# Patient Record
Sex: FEMALE | Race: WHITE | NOT HISPANIC OR LATINO | Employment: UNEMPLOYED | URBAN - NONMETROPOLITAN AREA
[De-identification: names, ages, dates, MRNs, and addresses within clinical notes are randomized per-mention and may not be internally consistent; named-entity substitution may affect disease eponyms.]

---

## 2017-07-10 ENCOUNTER — TRANSCRIBE ORDERS (OUTPATIENT)
Dept: ADMINISTRATIVE | Facility: HOSPITAL | Age: 42
End: 2017-07-10

## 2017-07-10 DIAGNOSIS — Z12.31 VISIT FOR SCREENING MAMMOGRAM: Primary | ICD-10-CM

## 2017-07-20 ENCOUNTER — HOSPITAL ENCOUNTER (OUTPATIENT)
Dept: MAMMOGRAPHY | Facility: HOSPITAL | Age: 42
Discharge: HOME | End: 2017-07-20
Admitting: NURSE PRACTITIONER

## 2017-07-20 DIAGNOSIS — Z12.31 VISIT FOR SCREENING MAMMOGRAM: ICD-10-CM

## 2017-07-20 PROCEDURE — 77063 BREAST TOMOSYNTHESIS BI: CPT

## 2017-07-20 PROCEDURE — 77067 SCR MAMMO BI INCL CAD: CPT | Performed by: RADIOLOGY

## 2017-07-20 PROCEDURE — G0202 SCR MAMMO BI INCL CAD: HCPCS

## 2017-07-20 PROCEDURE — 77063 BREAST TOMOSYNTHESIS BI: CPT | Performed by: RADIOLOGY

## 2017-08-20 ENCOUNTER — PREP FOR SURGERY (OUTPATIENT)
Dept: OTHER | Facility: HOSPITAL | Age: 42
End: 2017-08-20

## 2017-08-20 DIAGNOSIS — N93.9 ABNORMAL UTERINE BLEEDING: ICD-10-CM

## 2017-08-20 DIAGNOSIS — D25.9 LEIOMYOMA OF UTERUS, UNSPECIFIED: Primary | ICD-10-CM

## 2017-08-20 DIAGNOSIS — R19.00 ABDOMINAL OR PELVIC SWELLING, MASS OR LUMP, UNSPECIFIED SITE: ICD-10-CM

## 2017-08-20 RX ORDER — SODIUM CHLORIDE 0.9 % (FLUSH) 0.9 %
1-10 SYRINGE (ML) INJECTION AS NEEDED
Status: CANCELLED | OUTPATIENT
Start: 2017-08-20

## 2017-08-21 ENCOUNTER — APPOINTMENT (OUTPATIENT)
Dept: PREADMISSION TESTING | Facility: HOSPITAL | Age: 42
End: 2017-08-21

## 2017-08-21 DIAGNOSIS — R19.00 ABDOMINAL OR PELVIC SWELLING, MASS OR LUMP, UNSPECIFIED SITE: ICD-10-CM

## 2017-08-21 DIAGNOSIS — D25.9 LEIOMYOMA OF UTERUS, UNSPECIFIED: ICD-10-CM

## 2017-08-21 DIAGNOSIS — N93.9 ABNORMAL UTERINE BLEEDING: ICD-10-CM

## 2017-08-21 LAB
ABO GROUP BLD: NORMAL
BASOPHILS # BLD AUTO: 0.05 10*3/MM3 (ref 0–0.3)
BASOPHILS NFR BLD AUTO: 0.7 % (ref 0–2)
BLD GP AB SCN SERPL QL: NEGATIVE
DEPRECATED RDW RBC AUTO: 46 FL (ref 37–54)
EOSINOPHIL # BLD AUTO: 0.3 10*3/MM3 (ref 0–0.7)
EOSINOPHIL NFR BLD AUTO: 4.2 % (ref 0–5)
ERYTHROCYTE [DISTWIDTH] IN BLOOD BY AUTOMATED COUNT: 14.7 % (ref 11.5–14.5)
HCG SERPL QL: NEGATIVE
HCT VFR BLD AUTO: 37.1 % (ref 37–47)
HGB BLD-MCNC: 11.5 G/DL (ref 12–16)
IMM GRANULOCYTES # BLD: 0.01 10*3/MM3 (ref 0–0.03)
IMM GRANULOCYTES NFR BLD: 0.1 % (ref 0–0.5)
LYMPHOCYTES # BLD AUTO: 0.8 10*3/MM3 (ref 1–3)
LYMPHOCYTES NFR BLD AUTO: 11.1 % (ref 21–51)
MCH RBC QN AUTO: 27.3 PG (ref 27–33)
MCHC RBC AUTO-ENTMCNC: 31 G/DL (ref 33–37)
MCV RBC AUTO: 88.1 FL (ref 80–94)
MONOCYTES # BLD AUTO: 0.52 10*3/MM3 (ref 0.1–0.9)
MONOCYTES NFR BLD AUTO: 7.2 % (ref 0–10)
NEUTROPHILS # BLD AUTO: 5.54 10*3/MM3 (ref 1.4–6.5)
NEUTROPHILS NFR BLD AUTO: 76.7 % (ref 30–70)
PLATELET # BLD AUTO: 310 10*3/MM3 (ref 130–400)
PMV BLD AUTO: 9.8 FL (ref 6–10)
RBC # BLD AUTO: 4.21 10*6/MM3 (ref 4.2–5.4)
RH BLD: POSITIVE
WBC NRBC COR # BLD: 7.22 10*3/MM3 (ref 4.5–12.5)

## 2017-08-21 PROCEDURE — 84703 CHORIONIC GONADOTROPIN ASSAY: CPT | Performed by: PHYSICIAN ASSISTANT

## 2017-08-21 PROCEDURE — 36415 COLL VENOUS BLD VENIPUNCTURE: CPT

## 2017-08-21 PROCEDURE — 86900 BLOOD TYPING SEROLOGIC ABO: CPT | Performed by: PHYSICIAN ASSISTANT

## 2017-08-21 PROCEDURE — 86901 BLOOD TYPING SEROLOGIC RH(D): CPT | Performed by: PHYSICIAN ASSISTANT

## 2017-08-21 PROCEDURE — 85025 COMPLETE CBC W/AUTO DIFF WBC: CPT | Performed by: PHYSICIAN ASSISTANT

## 2017-08-21 PROCEDURE — 86850 RBC ANTIBODY SCREEN: CPT | Performed by: PHYSICIAN ASSISTANT

## 2017-08-21 RX ORDER — HYDROXYCHLOROQUINE SULFATE 200 MG/1
200 TABLET, FILM COATED ORAL 2 TIMES DAILY
COMMUNITY

## 2017-08-21 RX ORDER — DIPHENHYDRAMINE HCL 25 MG
25 CAPSULE ORAL NIGHTLY PRN
COMMUNITY

## 2017-08-21 RX ORDER — PREDNISONE 10 MG/1
10 TABLET ORAL DAILY PRN
COMMUNITY

## 2017-08-21 RX ORDER — FERROUS SULFATE 325(65) MG
325 TABLET ORAL 2 TIMES DAILY WITH MEALS
COMMUNITY

## 2017-08-21 RX ORDER — IBUPROFEN 200 MG
200 TABLET ORAL 2 TIMES DAILY PRN
COMMUNITY
End: 2017-08-24 | Stop reason: HOSPADM

## 2017-08-21 NOTE — DISCHARGE INSTRUCTIONS
TAKE the following medications the morning of surgery:  All heart or blood pressure medications    Please discontinue all blood thinners and anticoagulants (except aspirin) prior to surgery as per your surgeon and cardiologist instructions.  Aspirin may be continued up to the day prior to surgery.    HOLD all diabetic medications the morning of surgery as order by physician.    Arrival time for surgery on 8/23/2017 is 0630 am, please follow detailed instructions on hand out provided by nurse on use of prep cloths also provided by nurse. Return this   sheet to preop nurse on day of surgery  General Instructions:  • Do NOT eat or drink after midnight 8/22/2017 which includes water, mints, or gum.  • You may brush your teeth. Dental appliances that are removable must be taken out day of surgery.  • Do NOT smoke, chew tobacco, or drink alcohol within 24 hours prior to surgery.  • Bring medications in original bottles, any inhalers and if applicable your C-PAP/BI-PAP machine  • Bring any papers given to you in the doctor’s office  • Wear clean, comfortable clothes and socks  • Do NOT wear contact lenses or make-up or dark nail polish.  Bring a case for your glasses if applicable.  • Bring crutches or walker if applicable  • Leave all other valuables and jewelry at home  • If you were given a blood bank armband, continue to wear it until discharged.    Preventing a Surgical Site Infection:  • Shower on the morning of surgery using a fresh bar of anti-bacterial soap (such as Dial) and clean washcloth.  Dry with a clean towel and dress in clean clothing.  • For 2 to 3 days before surgery, avoid shaving with a razor near where you will have surgery because the razor can irritate skin and make it easier to develop an infection.  Ask your surgeon if you will be receiving antibiotics prior to surgery.  • Make sure you, your family, and all healthcare providers clean their hands with soap and water or an alcohol-based hand   before caring for you or your wound.  • If at all possible, quit smoking as many days before surgery as you can.    Day of Surgery:  Upon arrival, a pre-op nurse and anesthesiologist will review your health history, obtain vital signs, and answer questions you may have.  The only belongings needed at this time will be your home medications and if applicable you C-PAP/BI-PAP machine.  If you are staying overnight, your family can leave the rest of your belongings in the car and bring them to your room later.  A pre-op nurse will start an IV and you may receive medication in preparation for surgery.  Due to patient privacy and limited space, only one member of your family will be able to accompany you in the pre-op area.  While you are in surgery your family should notify the waiting room  if they leave the waiting room area and provide a contact number.  Please be aware that surgery does come with discomfort.  We want to make every effort to control your discomfort so please discuss any uncontrolled symptoms with your nurse.  Your doctor will most likely have prescribed pain medications.  If you are going home after surgery you will receive individualized written care instructions before being discharged.  A responsible adult must drive you to and from the hospital on the day of surgery and stay with you for 24 hours.  If you are staying overnight following surgery, you will be transported to your hospital room following the recovery period.

## 2017-08-23 ENCOUNTER — HOSPITAL ENCOUNTER (OUTPATIENT)
Facility: HOSPITAL | Age: 42
Setting detail: OBSERVATION
Discharge: HOME | End: 2017-08-24
Attending: OBSTETRICS & GYNECOLOGY | Admitting: OBSTETRICS & GYNECOLOGY

## 2017-08-23 ENCOUNTER — ANESTHESIA EVENT (OUTPATIENT)
Dept: PERIOP | Facility: HOSPITAL | Age: 42
End: 2017-08-23

## 2017-08-23 ENCOUNTER — ANESTHESIA (OUTPATIENT)
Dept: PERIOP | Facility: HOSPITAL | Age: 42
End: 2017-08-23

## 2017-08-23 DIAGNOSIS — N93.9 ABNORMAL UTERINE BLEEDING (AUB): ICD-10-CM

## 2017-08-23 PROCEDURE — G0378 HOSPITAL OBSERVATION PER HR: HCPCS

## 2017-08-23 PROCEDURE — 25010000002 MIDAZOLAM PER 1 MG: Performed by: NURSE ANESTHETIST, CERTIFIED REGISTERED

## 2017-08-23 PROCEDURE — 25010000002 ONDANSETRON PER 1 MG: Performed by: NURSE ANESTHETIST, CERTIFIED REGISTERED

## 2017-08-23 PROCEDURE — 25010000002 HYDROMORPHONE PER 4 MG: Performed by: NURSE ANESTHETIST, CERTIFIED REGISTERED

## 2017-08-23 PROCEDURE — 25010000002 FENTANYL CITRATE (PF) 100 MCG/2ML SOLUTION: Performed by: NURSE ANESTHETIST, CERTIFIED REGISTERED

## 2017-08-23 PROCEDURE — 25010000002 CEFOXITIN PER 1 G: Performed by: NURSE ANESTHETIST, CERTIFIED REGISTERED

## 2017-08-23 PROCEDURE — 25010000002 DEXAMETHASONE PER 1 MG: Performed by: NURSE ANESTHETIST, CERTIFIED REGISTERED

## 2017-08-23 PROCEDURE — 88307 TISSUE EXAM BY PATHOLOGIST: CPT | Performed by: OBSTETRICS & GYNECOLOGY

## 2017-08-23 PROCEDURE — 94799 UNLISTED PULMONARY SVC/PX: CPT

## 2017-08-23 PROCEDURE — 25010000002 KETOROLAC TROMETHAMINE PER 15 MG: Performed by: NURSE ANESTHETIST, CERTIFIED REGISTERED

## 2017-08-23 PROCEDURE — 25010000002 PROPOFOL 10 MG/ML EMULSION: Performed by: NURSE ANESTHETIST, CERTIFIED REGISTERED

## 2017-08-23 PROCEDURE — 25010000002 ONDANSETRON PER 1 MG: Performed by: OBSTETRICS & GYNECOLOGY

## 2017-08-23 RX ORDER — KETOROLAC TROMETHAMINE 30 MG/ML
30 INJECTION, SOLUTION INTRAMUSCULAR; INTRAVENOUS EVERY 6 HOURS PRN
Status: DISCONTINUED | OUTPATIENT
Start: 2017-08-23 | End: 2017-08-24 | Stop reason: HOSPADM

## 2017-08-23 RX ORDER — FERROUS SULFATE 325(65) MG
325 TABLET ORAL 2 TIMES DAILY WITH MEALS
Status: DISCONTINUED | OUTPATIENT
Start: 2017-08-23 | End: 2017-08-24 | Stop reason: HOSPADM

## 2017-08-23 RX ORDER — ONDANSETRON 2 MG/ML
4 INJECTION INTRAMUSCULAR; INTRAVENOUS EVERY 6 HOURS PRN
Status: DISCONTINUED | OUTPATIENT
Start: 2017-08-23 | End: 2017-08-24 | Stop reason: HOSPADM

## 2017-08-23 RX ORDER — PREDNISONE 10 MG/1
10 TABLET ORAL DAILY PRN
Status: CANCELLED | OUTPATIENT
Start: 2017-08-23

## 2017-08-23 RX ORDER — DEXAMETHASONE SODIUM PHOSPHATE 10 MG/ML
INJECTION INTRAMUSCULAR; INTRAVENOUS AS NEEDED
Status: DISCONTINUED | OUTPATIENT
Start: 2017-08-23 | End: 2017-08-23 | Stop reason: SURG

## 2017-08-23 RX ORDER — IPRATROPIUM BROMIDE AND ALBUTEROL SULFATE 2.5; .5 MG/3ML; MG/3ML
3 SOLUTION RESPIRATORY (INHALATION) ONCE AS NEEDED
Status: DISCONTINUED | OUTPATIENT
Start: 2017-08-23 | End: 2017-08-23 | Stop reason: HOSPADM

## 2017-08-23 RX ORDER — ROCURONIUM BROMIDE 10 MG/ML
INJECTION, SOLUTION INTRAVENOUS AS NEEDED
Status: DISCONTINUED | OUTPATIENT
Start: 2017-08-23 | End: 2017-08-23 | Stop reason: SURG

## 2017-08-23 RX ORDER — IBUPROFEN 800 MG/1
800 TABLET ORAL 3 TIMES DAILY
Status: DISCONTINUED | OUTPATIENT
Start: 2017-08-23 | End: 2017-08-24 | Stop reason: HOSPADM

## 2017-08-23 RX ORDER — PROMETHAZINE HYDROCHLORIDE 12.5 MG/1
12.5 SUPPOSITORY RECTAL EVERY 6 HOURS PRN
Status: DISCONTINUED | OUTPATIENT
Start: 2017-08-23 | End: 2017-08-24 | Stop reason: HOSPADM

## 2017-08-23 RX ORDER — PROMETHAZINE HYDROCHLORIDE 25 MG/ML
12.5 INJECTION, SOLUTION INTRAMUSCULAR; INTRAVENOUS EVERY 6 HOURS PRN
Status: DISCONTINUED | OUTPATIENT
Start: 2017-08-23 | End: 2017-08-24 | Stop reason: HOSPADM

## 2017-08-23 RX ORDER — CEFOXITIN 2 G/1
INJECTION, POWDER, FOR SOLUTION INTRAVENOUS AS NEEDED
Status: DISCONTINUED | OUTPATIENT
Start: 2017-08-23 | End: 2017-08-23 | Stop reason: SURG

## 2017-08-23 RX ORDER — FENTANYL CITRATE 50 UG/ML
INJECTION, SOLUTION INTRAMUSCULAR; INTRAVENOUS AS NEEDED
Status: DISCONTINUED | OUTPATIENT
Start: 2017-08-23 | End: 2017-08-23 | Stop reason: SURG

## 2017-08-23 RX ORDER — MAGNESIUM HYDROXIDE 1200 MG/15ML
LIQUID ORAL AS NEEDED
Status: DISCONTINUED | OUTPATIENT
Start: 2017-08-23 | End: 2017-08-23 | Stop reason: HOSPADM

## 2017-08-23 RX ORDER — OXYCODONE HYDROCHLORIDE AND ACETAMINOPHEN 5; 325 MG/1; MG/1
1 TABLET ORAL EVERY 4 HOURS PRN
Status: DISCONTINUED | OUTPATIENT
Start: 2017-08-23 | End: 2017-08-24 | Stop reason: HOSPADM

## 2017-08-23 RX ORDER — FERROUS SULFATE 325(65) MG
325 TABLET ORAL 2 TIMES DAILY WITH MEALS
Status: CANCELLED | OUTPATIENT
Start: 2017-08-23

## 2017-08-23 RX ORDER — HYDROXYCHLOROQUINE SULFATE 200 MG/1
200 TABLET, FILM COATED ORAL EVERY 12 HOURS SCHEDULED
Status: CANCELLED | OUTPATIENT
Start: 2017-08-23

## 2017-08-23 RX ORDER — KETOROLAC TROMETHAMINE 30 MG/ML
INJECTION, SOLUTION INTRAMUSCULAR; INTRAVENOUS AS NEEDED
Status: DISCONTINUED | OUTPATIENT
Start: 2017-08-23 | End: 2017-08-23 | Stop reason: SURG

## 2017-08-23 RX ORDER — HYDROMORPHONE HCL 110MG/55ML
PATIENT CONTROLLED ANALGESIA SYRINGE INTRAVENOUS AS NEEDED
Status: DISCONTINUED | OUTPATIENT
Start: 2017-08-23 | End: 2017-08-23 | Stop reason: SURG

## 2017-08-23 RX ORDER — SODIUM CHLORIDE 0.9 % (FLUSH) 0.9 %
1-10 SYRINGE (ML) INJECTION AS NEEDED
Status: DISCONTINUED | OUTPATIENT
Start: 2017-08-23 | End: 2017-08-23 | Stop reason: HOSPADM

## 2017-08-23 RX ORDER — MIDAZOLAM HYDROCHLORIDE 1 MG/ML
INJECTION INTRAMUSCULAR; INTRAVENOUS AS NEEDED
Status: DISCONTINUED | OUTPATIENT
Start: 2017-08-23 | End: 2017-08-23 | Stop reason: SURG

## 2017-08-23 RX ORDER — FAMOTIDINE 10 MG/ML
INJECTION, SOLUTION INTRAVENOUS AS NEEDED
Status: DISCONTINUED | OUTPATIENT
Start: 2017-08-23 | End: 2017-08-23 | Stop reason: SURG

## 2017-08-23 RX ORDER — ONDANSETRON 4 MG/1
4 TABLET, ORALLY DISINTEGRATING ORAL EVERY 6 HOURS PRN
Status: DISCONTINUED | OUTPATIENT
Start: 2017-08-23 | End: 2017-08-24 | Stop reason: HOSPADM

## 2017-08-23 RX ORDER — DIPHENHYDRAMINE HCL 25 MG
25 CAPSULE ORAL DAILY PRN
Status: DISCONTINUED | OUTPATIENT
Start: 2017-08-23 | End: 2017-08-24 | Stop reason: HOSPADM

## 2017-08-23 RX ORDER — NALOXONE HCL 0.4 MG/ML
0.1 VIAL (ML) INJECTION
Status: DISCONTINUED | OUTPATIENT
Start: 2017-08-23 | End: 2017-08-24 | Stop reason: HOSPADM

## 2017-08-23 RX ORDER — PREDNISONE 10 MG/1
10 TABLET ORAL DAILY PRN
Status: DISCONTINUED | OUTPATIENT
Start: 2017-08-23 | End: 2017-08-24 | Stop reason: HOSPADM

## 2017-08-23 RX ORDER — ONDANSETRON 2 MG/ML
4 INJECTION INTRAMUSCULAR; INTRAVENOUS ONCE AS NEEDED
Status: DISCONTINUED | OUTPATIENT
Start: 2017-08-23 | End: 2017-08-23 | Stop reason: HOSPADM

## 2017-08-23 RX ORDER — LIDOCAINE HYDROCHLORIDE 20 MG/ML
INJECTION, SOLUTION INFILTRATION; PERINEURAL AS NEEDED
Status: DISCONTINUED | OUTPATIENT
Start: 2017-08-23 | End: 2017-08-23 | Stop reason: SURG

## 2017-08-23 RX ORDER — OXYCODONE AND ACETAMINOPHEN 10; 325 MG/1; MG/1
1 TABLET ORAL EVERY 4 HOURS PRN
Status: DISCONTINUED | OUTPATIENT
Start: 2017-08-23 | End: 2017-08-24 | Stop reason: HOSPADM

## 2017-08-23 RX ORDER — METOCLOPRAMIDE HYDROCHLORIDE 5 MG/ML
10 INJECTION INTRAMUSCULAR; INTRAVENOUS EVERY 6 HOURS PRN
Status: DISCONTINUED | OUTPATIENT
Start: 2017-08-23 | End: 2017-08-24 | Stop reason: HOSPADM

## 2017-08-23 RX ORDER — PROPOFOL 10 MG/ML
VIAL (ML) INTRAVENOUS AS NEEDED
Status: DISCONTINUED | OUTPATIENT
Start: 2017-08-23 | End: 2017-08-23 | Stop reason: SURG

## 2017-08-23 RX ORDER — HYDROXYCHLOROQUINE SULFATE 200 MG/1
200 TABLET, FILM COATED ORAL EVERY 12 HOURS SCHEDULED
Status: DISCONTINUED | OUTPATIENT
Start: 2017-08-23 | End: 2017-08-24 | Stop reason: HOSPADM

## 2017-08-23 RX ORDER — SODIUM CHLORIDE, SODIUM LACTATE, POTASSIUM CHLORIDE, CALCIUM CHLORIDE 600; 310; 30; 20 MG/100ML; MG/100ML; MG/100ML; MG/100ML
125 INJECTION, SOLUTION INTRAVENOUS CONTINUOUS
Status: DISCONTINUED | OUTPATIENT
Start: 2017-08-23 | End: 2017-08-24 | Stop reason: HOSPADM

## 2017-08-23 RX ORDER — SODIUM CHLORIDE, SODIUM LACTATE, POTASSIUM CHLORIDE, CALCIUM CHLORIDE 600; 310; 30; 20 MG/100ML; MG/100ML; MG/100ML; MG/100ML
125 INJECTION, SOLUTION INTRAVENOUS CONTINUOUS
Status: DISCONTINUED | OUTPATIENT
Start: 2017-08-23 | End: 2017-08-23 | Stop reason: HOSPADM

## 2017-08-23 RX ORDER — MEPERIDINE HYDROCHLORIDE 25 MG/ML
12.5 INJECTION INTRAMUSCULAR; INTRAVENOUS; SUBCUTANEOUS
Status: DISCONTINUED | OUTPATIENT
Start: 2017-08-23 | End: 2017-08-23 | Stop reason: HOSPADM

## 2017-08-23 RX ORDER — PROMETHAZINE HYDROCHLORIDE 25 MG/1
12.5 TABLET ORAL EVERY 6 HOURS PRN
Status: DISCONTINUED | OUTPATIENT
Start: 2017-08-23 | End: 2017-08-24 | Stop reason: HOSPADM

## 2017-08-23 RX ORDER — ONDANSETRON 4 MG/1
4 TABLET, FILM COATED ORAL EVERY 6 HOURS PRN
Status: DISCONTINUED | OUTPATIENT
Start: 2017-08-23 | End: 2017-08-24 | Stop reason: HOSPADM

## 2017-08-23 RX ORDER — ZOLPIDEM TARTRATE 5 MG/1
5 TABLET ORAL NIGHTLY PRN
Status: DISCONTINUED | OUTPATIENT
Start: 2017-08-23 | End: 2017-08-24 | Stop reason: HOSPADM

## 2017-08-23 RX ORDER — ONDANSETRON 2 MG/ML
INJECTION INTRAMUSCULAR; INTRAVENOUS AS NEEDED
Status: DISCONTINUED | OUTPATIENT
Start: 2017-08-23 | End: 2017-08-23 | Stop reason: SURG

## 2017-08-23 RX ORDER — IBUPROFEN 200 MG
200 TABLET ORAL 2 TIMES DAILY PRN
Status: DISCONTINUED | OUTPATIENT
Start: 2017-08-23 | End: 2017-08-24 | Stop reason: HOSPADM

## 2017-08-23 RX ORDER — BACTERIOSTATIC SODIUM CHLORIDE 0.9 %
VIAL (ML) INJECTION AS NEEDED
Status: DISCONTINUED | OUTPATIENT
Start: 2017-08-23 | End: 2017-08-23 | Stop reason: HOSPADM

## 2017-08-23 RX ORDER — ACETAMINOPHEN 325 MG/1
650 TABLET ORAL EVERY 4 HOURS PRN
Status: DISCONTINUED | OUTPATIENT
Start: 2017-08-23 | End: 2017-08-24 | Stop reason: HOSPADM

## 2017-08-23 RX ORDER — FENTANYL CITRATE 50 UG/ML
50 INJECTION, SOLUTION INTRAMUSCULAR; INTRAVENOUS
Status: DISCONTINUED | OUTPATIENT
Start: 2017-08-23 | End: 2017-08-23 | Stop reason: HOSPADM

## 2017-08-23 RX ORDER — OXYCODONE HYDROCHLORIDE AND ACETAMINOPHEN 5; 325 MG/1; MG/1
1 TABLET ORAL ONCE AS NEEDED
Status: DISCONTINUED | OUTPATIENT
Start: 2017-08-23 | End: 2017-08-23 | Stop reason: HOSPADM

## 2017-08-23 RX ORDER — HYDROMORPHONE HYDROCHLORIDE 1 MG/ML
0.5 INJECTION, SOLUTION INTRAMUSCULAR; INTRAVENOUS; SUBCUTANEOUS
Status: DISCONTINUED | OUTPATIENT
Start: 2017-08-23 | End: 2017-08-24 | Stop reason: HOSPADM

## 2017-08-23 RX ORDER — DIPHENHYDRAMINE HCL 25 MG
25 CAPSULE ORAL DAILY PRN
Status: CANCELLED | OUTPATIENT
Start: 2017-08-23

## 2017-08-23 RX ORDER — IBUPROFEN 200 MG
200 TABLET ORAL 2 TIMES DAILY PRN
Status: CANCELLED | OUTPATIENT
Start: 2017-08-23

## 2017-08-23 RX ADMIN — ONDANSETRON 4 MG: 2 INJECTION, SOLUTION INTRAMUSCULAR; INTRAVENOUS at 08:04

## 2017-08-23 RX ADMIN — LIDOCAINE HYDROCHLORIDE 40 MG: 20 INJECTION, SOLUTION INFILTRATION; PERINEURAL at 07:40

## 2017-08-23 RX ADMIN — FAMOTIDINE 20 MG: 10 INJECTION, SOLUTION INTRAVENOUS at 08:04

## 2017-08-23 RX ADMIN — MIDAZOLAM HYDROCHLORIDE 2 MG: 1 INJECTION, SOLUTION INTRAMUSCULAR; INTRAVENOUS at 07:40

## 2017-08-23 RX ADMIN — KETOROLAC TROMETHAMINE 30 MG: 30 INJECTION, SOLUTION INTRAMUSCULAR; INTRAVENOUS at 08:45

## 2017-08-23 RX ADMIN — CEFOXITIN 2 G: 2 INJECTION, POWDER, FOR SOLUTION INTRAVENOUS at 07:53

## 2017-08-23 RX ADMIN — SODIUM CHLORIDE, POTASSIUM CHLORIDE, SODIUM LACTATE AND CALCIUM CHLORIDE 125 ML/HR: 600; 310; 30; 20 INJECTION, SOLUTION INTRAVENOUS at 18:34

## 2017-08-23 RX ADMIN — ONDANSETRON 4 MG: 2 INJECTION, SOLUTION INTRAMUSCULAR; INTRAVENOUS at 17:18

## 2017-08-23 RX ADMIN — HYDROXYCHLOROQUINE SULFATE 200 MG: 200 TABLET ORAL at 20:27

## 2017-08-23 RX ADMIN — SODIUM CHLORIDE, POTASSIUM CHLORIDE, SODIUM LACTATE AND CALCIUM CHLORIDE: 600; 310; 30; 20 INJECTION, SOLUTION INTRAVENOUS at 07:40

## 2017-08-23 RX ADMIN — HYDROMORPHONE HYDROCHLORIDE 1 MG: 2 INJECTION, SOLUTION INTRAMUSCULAR; INTRAVENOUS; SUBCUTANEOUS at 08:20

## 2017-08-23 RX ADMIN — ROCURONIUM BROMIDE 30 MG: 10 INJECTION INTRAVENOUS at 07:48

## 2017-08-23 RX ADMIN — PROPOFOL 120 MG: 10 INJECTION, EMULSION INTRAVENOUS at 07:48

## 2017-08-23 RX ADMIN — IBUPROFEN 800 MG: 800 TABLET ORAL at 21:59

## 2017-08-23 RX ADMIN — SODIUM CHLORIDE, POTASSIUM CHLORIDE, SODIUM LACTATE AND CALCIUM CHLORIDE 125 ML/HR: 600; 310; 30; 20 INJECTION, SOLUTION INTRAVENOUS at 07:07

## 2017-08-23 RX ADMIN — HYDROMORPHONE HYDROCHLORIDE 1 MG: 2 INJECTION, SOLUTION INTRAMUSCULAR; INTRAVENOUS; SUBCUTANEOUS at 08:04

## 2017-08-23 RX ADMIN — SODIUM CHLORIDE, POTASSIUM CHLORIDE, SODIUM LACTATE AND CALCIUM CHLORIDE: 600; 310; 30; 20 INJECTION, SOLUTION INTRAVENOUS at 08:30

## 2017-08-23 RX ADMIN — SODIUM CHLORIDE, POTASSIUM CHLORIDE, SODIUM LACTATE AND CALCIUM CHLORIDE 125 ML/HR: 600; 310; 30; 20 INJECTION, SOLUTION INTRAVENOUS at 12:34

## 2017-08-23 RX ADMIN — DEXAMETHASONE SODIUM PHOSPHATE 8 MG: 10 INJECTION INTRAMUSCULAR; INTRAVENOUS at 08:04

## 2017-08-23 RX ADMIN — FENTANYL CITRATE 100 MCG: 50 INJECTION INTRAMUSCULAR; INTRAVENOUS at 07:40

## 2017-08-23 NOTE — ANESTHESIA PROCEDURE NOTES
Airway  Urgency: elective    Date/Time: 8/23/2017 7:50 AM  Airway not difficult    General Information and Staff    Patient location during procedure: OR  Anesthesiologist: KEZIA CASTANON  CRNA: SUSHIL BOREDN    Indications and Patient Condition  Indications for airway management: airway protection    Preoxygenated: yes  MILS maintained throughout  Mask difficulty assessment: 1 - vent by mask    Final Airway Details  Final airway type: endotracheal airway      Successful airway: ETT  Cuffed: yes   Successful intubation technique: direct laryngoscopy  Endotracheal tube insertion site: oral  Blade: Morgan  Blade size: #2  ETT size: 7.5 mm  Cormack-Lehane Classification: grade I - full view of glottis  Placement verified by: chest auscultation   Measured from: lips  ETT to lips (cm): 20  Number of attempts at approach: 1    Additional Comments  No trauma or change to dentition

## 2017-08-23 NOTE — ANESTHESIA PREPROCEDURE EVALUATION
Anesthesia Evaluation     Patient summary reviewed and Nursing notes reviewed   no history of anesthetic complications:  NPO Solid Status: > 8 hours  NPO Liquid Status: > 8 hours     Airway   Mallampati: II  TM distance: >3 FB  Neck ROM: full  no difficulty expected  Dental - normal exam     Pulmonary - normal exam   (+) a smoker Current,   (-) asthma  Cardiovascular - normal exam  Exercise tolerance: good (4-7 METS)    NYHA Classification: II    (+) hyperlipidemia  (-) hypertension, past MI, dysrhythmias, angina, CHF      Neuro/Psych  (+) psychiatric history Anxiety and Depression,    (-) seizures, CVA  GI/Hepatic/Renal/Endo - negative ROS   (-) GERD, diabetes, hypothyroidism    Musculoskeletal     (+) arthralgias (SLE),   Abdominal  - normal exam    Bowel sounds: normal.   Substance History - negative use     OB/GYN negative ob/gyn ROS         Other        (-) arthritis                                  Anesthesia Plan    ASA 2     general     intravenous induction   Anesthetic plan and risks discussed with patient.  Use of blood products discussed with patient  Consented to blood products.

## 2017-08-23 NOTE — ANESTHESIA POSTPROCEDURE EVALUATION
Patient: Nadine Bacon    Procedure Summary     Date Anesthesia Start Anesthesia Stop Room / Location    08/23/17 0744 0905  [facility] / [facility]       Procedure Diagnosis Surgeon Provider    TOTAL ABDOMINAL HYSTERECTOMY  LEFT SALPINGO OOPHORECTOMY RIGHT SALPINGECTOMY. (Bilateral Abdomen) (D25.9,R10.00) DO Red Elizabeth MD          Anesthesia Type: general  Last vitals  BP   120/71 (08/23/17 0948)    Temp     97.8     Pulse   73 (08/23/17 0948)   Resp   16 (08/23/17 0948)    SpO2   100 % (08/23/17 0948)      Post Anesthesia Care and Evaluation    Patient location during evaluation: PHASE II  Patient participation: complete - patient participated  Level of consciousness: awake and alert  Pain score: 1  Pain management: adequate  Airway patency: patent  Anesthetic complications: No anesthetic complications  PONV Status: controlled  Cardiovascular status: acceptable  Respiratory status: acceptable  Hydration status: acceptable

## 2017-08-24 VITALS
RESPIRATION RATE: 18 BRPM | TEMPERATURE: 98.6 F | WEIGHT: 100 LBS | DIASTOLIC BLOOD PRESSURE: 67 MMHG | SYSTOLIC BLOOD PRESSURE: 125 MMHG | HEART RATE: 68 BPM | HEIGHT: 61 IN | BODY MASS INDEX: 18.88 KG/M2 | OXYGEN SATURATION: 98 %

## 2017-08-24 LAB
DEPRECATED RDW RBC AUTO: 44.9 FL (ref 37–54)
ERYTHROCYTE [DISTWIDTH] IN BLOOD BY AUTOMATED COUNT: 14.7 % (ref 11.5–14.5)
HCT VFR BLD AUTO: 32.8 % (ref 37–47)
HGB BLD-MCNC: 10.4 G/DL (ref 12–16)
MCH RBC QN AUTO: 27.9 PG (ref 27–33)
MCHC RBC AUTO-ENTMCNC: 31.7 G/DL (ref 33–37)
MCV RBC AUTO: 87.9 FL (ref 80–94)
PLATELET # BLD AUTO: 266 10*3/MM3 (ref 130–400)
PMV BLD AUTO: 9.7 FL (ref 6–10)
RBC # BLD AUTO: 3.73 10*6/MM3 (ref 4.2–5.4)
WBC NRBC COR # BLD: 12.34 10*3/MM3 (ref 4.5–12.5)

## 2017-08-24 PROCEDURE — 85027 COMPLETE CBC AUTOMATED: CPT | Performed by: OBSTETRICS & GYNECOLOGY

## 2017-08-24 PROCEDURE — 25010000002 ENOXAPARIN PER 10 MG: Performed by: OBSTETRICS & GYNECOLOGY

## 2017-08-24 PROCEDURE — G0378 HOSPITAL OBSERVATION PER HR: HCPCS

## 2017-08-24 RX ORDER — OXYCODONE HYDROCHLORIDE AND ACETAMINOPHEN 5; 325 MG/1; MG/1
1 TABLET ORAL EVERY 4 HOURS PRN
Qty: 30 TABLET | Refills: 0 | Status: SHIPPED | OUTPATIENT
Start: 2017-08-24 | End: 2017-09-02

## 2017-08-24 RX ORDER — IBUPROFEN 600 MG/1
600 TABLET ORAL EVERY 6 HOURS PRN
Qty: 30 TABLET | Refills: 0 | Status: SHIPPED | OUTPATIENT
Start: 2017-08-24 | End: 2017-09-23

## 2017-08-24 RX ORDER — DOCUSATE CALCIUM 240 MG
240 CAPSULE ORAL DAILY
Qty: 30 CAPSULE | Refills: 1 | Status: ON HOLD | OUTPATIENT
Start: 2017-08-24 | End: 2017-11-11

## 2017-08-24 RX ADMIN — IBUPROFEN 800 MG: 800 TABLET ORAL at 07:22

## 2017-08-24 RX ADMIN — IBUPROFEN 800 MG: 800 TABLET ORAL at 13:21

## 2017-08-24 RX ADMIN — ZOLPIDEM TARTRATE 5 MG: 5 TABLET ORAL at 00:17

## 2017-08-24 RX ADMIN — OXYCODONE HYDROCHLORIDE AND ACETAMINOPHEN 1 TABLET: 5; 325 TABLET ORAL at 08:40

## 2017-08-24 RX ADMIN — ENOXAPARIN SODIUM 40 MG: 40 INJECTION SUBCUTANEOUS at 08:35

## 2017-08-24 RX ADMIN — FERROUS SULFATE TAB 325 MG (65 MG ELEMENTAL FE) 325 MG: 325 (65 FE) TAB at 08:35

## 2017-08-24 RX ADMIN — HYDROXYCHLOROQUINE SULFATE 200 MG: 200 TABLET ORAL at 08:35

## 2017-08-28 LAB
LAB AP CASE REPORT: NORMAL
Lab: NORMAL
PATH REPORT.FINAL DX SPEC: NORMAL

## 2017-11-11 ENCOUNTER — HOSPITAL ENCOUNTER (OUTPATIENT)
Facility: HOSPITAL | Age: 42
Setting detail: OBSERVATION
Discharge: HOME | End: 2017-11-13
Attending: OBSTETRICS & GYNECOLOGY | Admitting: OBSTETRICS & GYNECOLOGY

## 2017-11-11 PROCEDURE — G0378 HOSPITAL OBSERVATION PER HR: HCPCS

## 2017-11-11 PROCEDURE — 87086 URINE CULTURE/COLONY COUNT: CPT | Performed by: OBSTETRICS & GYNECOLOGY

## 2017-11-11 PROCEDURE — 25010000002 GENTAMICIN PER 80 MG: Performed by: OBSTETRICS & GYNECOLOGY

## 2017-11-11 PROCEDURE — 87040 BLOOD CULTURE FOR BACTERIA: CPT | Performed by: OBSTETRICS & GYNECOLOGY

## 2017-11-11 RX ORDER — SODIUM CHLORIDE, SODIUM LACTATE, POTASSIUM CHLORIDE, CALCIUM CHLORIDE 600; 310; 30; 20 MG/100ML; MG/100ML; MG/100ML; MG/100ML
50 INJECTION, SOLUTION INTRAVENOUS CONTINUOUS
Status: DISCONTINUED | OUTPATIENT
Start: 2017-11-11 | End: 2017-11-13 | Stop reason: HOSPADM

## 2017-11-11 RX ORDER — ONDANSETRON 4 MG/1
4 TABLET, ORALLY DISINTEGRATING ORAL EVERY 6 HOURS PRN
Status: DISCONTINUED | OUTPATIENT
Start: 2017-11-11 | End: 2017-11-13 | Stop reason: HOSPADM

## 2017-11-11 RX ORDER — DIPHENHYDRAMINE HCL 25 MG
25 CAPSULE ORAL NIGHTLY PRN
Status: CANCELLED | OUTPATIENT
Start: 2017-11-11

## 2017-11-11 RX ORDER — PREDNISONE 10 MG/1
10 TABLET ORAL DAILY PRN
Status: DISCONTINUED | OUTPATIENT
Start: 2017-11-11 | End: 2017-11-13 | Stop reason: HOSPADM

## 2017-11-11 RX ORDER — OXYCODONE AND ACETAMINOPHEN 10; 325 MG/1; MG/1
1 TABLET ORAL EVERY 4 HOURS PRN
Status: DISCONTINUED | OUTPATIENT
Start: 2017-11-11 | End: 2017-11-13 | Stop reason: HOSPADM

## 2017-11-11 RX ORDER — CLINDAMYCIN PHOSPHATE 600 MG/50ML
600 INJECTION INTRAVENOUS EVERY 6 HOURS
Status: DISCONTINUED | OUTPATIENT
Start: 2017-11-11 | End: 2017-11-13

## 2017-11-11 RX ORDER — PREDNISONE 10 MG/1
10 TABLET ORAL DAILY PRN
Status: CANCELLED | OUTPATIENT
Start: 2017-11-11

## 2017-11-11 RX ORDER — FERROUS SULFATE 325(65) MG
325 TABLET ORAL 2 TIMES DAILY WITH MEALS
Status: DISCONTINUED | OUTPATIENT
Start: 2017-11-12 | End: 2017-11-13 | Stop reason: HOSPADM

## 2017-11-11 RX ORDER — DIPHENHYDRAMINE HCL 25 MG
25 CAPSULE ORAL NIGHTLY PRN
Status: DISCONTINUED | OUTPATIENT
Start: 2017-11-11 | End: 2017-11-13 | Stop reason: HOSPADM

## 2017-11-11 RX ORDER — GENTAMICIN SULFATE 80 MG/100ML
80 INJECTION, SOLUTION INTRAVENOUS ONCE
Status: COMPLETED | OUTPATIENT
Start: 2017-11-11 | End: 2017-11-12

## 2017-11-11 RX ORDER — ONDANSETRON 2 MG/ML
4 INJECTION INTRAMUSCULAR; INTRAVENOUS EVERY 6 HOURS PRN
Status: DISCONTINUED | OUTPATIENT
Start: 2017-11-11 | End: 2017-11-13 | Stop reason: HOSPADM

## 2017-11-11 RX ORDER — FERROUS SULFATE 325(65) MG
325 TABLET ORAL 2 TIMES DAILY WITH MEALS
Status: CANCELLED | OUTPATIENT
Start: 2017-11-11

## 2017-11-11 RX ORDER — ONDANSETRON 4 MG/1
4 TABLET, FILM COATED ORAL EVERY 6 HOURS PRN
Status: DISCONTINUED | OUTPATIENT
Start: 2017-11-11 | End: 2017-11-13 | Stop reason: HOSPADM

## 2017-11-11 RX ORDER — HYDROXYCHLOROQUINE SULFATE 200 MG/1
200 TABLET, FILM COATED ORAL 2 TIMES DAILY
Status: CANCELLED | OUTPATIENT
Start: 2017-11-11

## 2017-11-11 RX ORDER — HYDROXYCHLOROQUINE SULFATE 200 MG/1
200 TABLET, FILM COATED ORAL 2 TIMES DAILY
Status: DISCONTINUED | OUTPATIENT
Start: 2017-11-11 | End: 2017-11-13 | Stop reason: HOSPADM

## 2017-11-11 RX ADMIN — OXYCODONE HYDROCHLORIDE AND ACETAMINOPHEN 1 TABLET: 10; 325 TABLET ORAL at 21:24

## 2017-11-11 RX ADMIN — HYDROXYCHLOROQUINE SULFATE 200 MG: 200 TABLET, FILM COATED ORAL at 22:27

## 2017-11-11 RX ADMIN — CLINDAMYCIN PHOSPHATE 600 MG: 12 INJECTION, SOLUTION INTRAVENOUS at 21:55

## 2017-11-11 RX ADMIN — SODIUM CHLORIDE, POTASSIUM CHLORIDE, SODIUM LACTATE AND CALCIUM CHLORIDE 50 ML/HR: 600; 310; 30; 20 INJECTION, SOLUTION INTRAVENOUS at 21:12

## 2017-11-11 RX ADMIN — GENTAMICIN SULFATE 80 MG: 80 INJECTION, SOLUTION INTRAVENOUS at 23:36

## 2017-11-11 RX ADMIN — AMPICILLIN SODIUM 2 G: 2 INJECTION, POWDER, FOR SOLUTION INTRAMUSCULAR; INTRAVENOUS at 22:26

## 2017-11-12 PROBLEM — R10.2 PELVIC PAIN: Status: ACTIVE | Noted: 2017-11-12

## 2017-11-12 LAB
ANION GAP SERPL CALCULATED.3IONS-SCNC: 2.2 MMOL/L (ref 3.6–11.2)
BASOPHILS # BLD AUTO: 0.02 10*3/MM3 (ref 0–0.3)
BASOPHILS NFR BLD AUTO: 0.2 % (ref 0–2)
BUN BLD-MCNC: 9 MG/DL (ref 7–21)
BUN/CREAT SERPL: 18.8 (ref 7–25)
CALCIUM SPEC-SCNC: 9.1 MG/DL (ref 7.7–10)
CHLORIDE SERPL-SCNC: 104 MMOL/L (ref 99–112)
CO2 SERPL-SCNC: 27.8 MMOL/L (ref 24.3–31.9)
CREAT BLD-MCNC: 0.48 MG/DL (ref 0.43–1.29)
DEPRECATED RDW RBC AUTO: 39.7 FL (ref 37–54)
EOSINOPHIL # BLD AUTO: 0.18 10*3/MM3 (ref 0–0.7)
EOSINOPHIL NFR BLD AUTO: 1.6 % (ref 0–5)
ERYTHROCYTE [DISTWIDTH] IN BLOOD BY AUTOMATED COUNT: 13.4 % (ref 11.5–14.5)
GFR SERPL CREATININE-BSD FRML MDRD: 143 ML/MIN/1.73
GLUCOSE BLD-MCNC: 95 MG/DL (ref 70–110)
HCT VFR BLD AUTO: 39.8 % (ref 37–47)
HGB BLD-MCNC: 13.1 G/DL (ref 12–16)
IMM GRANULOCYTES # BLD: 0.03 10*3/MM3 (ref 0–0.03)
IMM GRANULOCYTES NFR BLD: 0.3 % (ref 0–0.5)
LYMPHOCYTES # BLD AUTO: 1.23 10*3/MM3 (ref 1–3)
LYMPHOCYTES NFR BLD AUTO: 10.7 % (ref 21–51)
MCH RBC QN AUTO: 26.6 PG (ref 27–33)
MCHC RBC AUTO-ENTMCNC: 32.9 G/DL (ref 33–37)
MCV RBC AUTO: 80.7 FL (ref 80–94)
MONOCYTES # BLD AUTO: 0.86 10*3/MM3 (ref 0.1–0.9)
MONOCYTES NFR BLD AUTO: 7.5 % (ref 0–10)
NEUTROPHILS # BLD AUTO: 9.21 10*3/MM3 (ref 1.4–6.5)
NEUTROPHILS NFR BLD AUTO: 79.7 % (ref 30–70)
OSMOLALITY SERPL CALC.SUM OF ELEC: 266.7 MOSM/KG (ref 273–305)
PLATELET # BLD AUTO: 182 10*3/MM3 (ref 130–400)
PMV BLD AUTO: 9.9 FL (ref 6–10)
POTASSIUM BLD-SCNC: 3.4 MMOL/L (ref 3.5–5.3)
RBC # BLD AUTO: 4.93 10*6/MM3 (ref 4.2–5.4)
SODIUM BLD-SCNC: 134 MMOL/L (ref 135–153)
WBC NRBC COR # BLD: 11.53 10*3/MM3 (ref 4.5–12.5)

## 2017-11-12 PROCEDURE — 96365 THER/PROPH/DIAG IV INF INIT: CPT

## 2017-11-12 PROCEDURE — 80048 BASIC METABOLIC PNL TOTAL CA: CPT

## 2017-11-12 PROCEDURE — G0378 HOSPITAL OBSERVATION PER HR: HCPCS

## 2017-11-12 PROCEDURE — 25010000002 GENTAMICIN PER 80 MG

## 2017-11-12 PROCEDURE — 25010000002 ONDANSETRON PER 1 MG: Performed by: OBSTETRICS & GYNECOLOGY

## 2017-11-12 PROCEDURE — 85025 COMPLETE CBC W/AUTO DIFF WBC: CPT | Performed by: OBSTETRICS & GYNECOLOGY

## 2017-11-12 RX ORDER — GENTAMICIN SULFATE 80 MG/100ML
80 INJECTION, SOLUTION INTRAVENOUS EVERY 8 HOURS
Status: CANCELLED | OUTPATIENT
Start: 2017-11-12 | End: 2017-11-15

## 2017-11-12 RX ORDER — PHENAZOPYRIDINE HYDROCHLORIDE 200 MG/1
200 TABLET, FILM COATED ORAL
Status: DISCONTINUED | OUTPATIENT
Start: 2017-11-12 | End: 2017-11-12

## 2017-11-12 RX ORDER — PHENAZOPYRIDINE HYDROCHLORIDE 200 MG/1
200 TABLET, FILM COATED ORAL ONCE
Status: COMPLETED | OUTPATIENT
Start: 2017-11-12 | End: 2017-11-12

## 2017-11-12 RX ADMIN — OXYCODONE HYDROCHLORIDE AND ACETAMINOPHEN 1 TABLET: 10; 325 TABLET ORAL at 19:53

## 2017-11-12 RX ADMIN — HYDROXYCHLOROQUINE SULFATE 200 MG: 200 TABLET, FILM COATED ORAL at 17:53

## 2017-11-12 RX ADMIN — CLINDAMYCIN PHOSPHATE 600 MG: 12 INJECTION, SOLUTION INTRAVENOUS at 22:04

## 2017-11-12 RX ADMIN — PHENAZOPYRIDINE HYDROCHLORIDE 200 MG: 200 TABLET ORAL at 10:59

## 2017-11-12 RX ADMIN — FERROUS SULFATE TAB 325 MG (65 MG ELEMENTAL FE) 325 MG: 325 (65 FE) TAB at 08:36

## 2017-11-12 RX ADMIN — CLINDAMYCIN PHOSPHATE 600 MG: 12 INJECTION, SOLUTION INTRAVENOUS at 10:09

## 2017-11-12 RX ADMIN — OXYCODONE HYDROCHLORIDE AND ACETAMINOPHEN 1 TABLET: 10; 325 TABLET ORAL at 11:05

## 2017-11-12 RX ADMIN — OXYCODONE HYDROCHLORIDE AND ACETAMINOPHEN 1 TABLET: 10; 325 TABLET ORAL at 04:49

## 2017-11-12 RX ADMIN — SODIUM CHLORIDE, POTASSIUM CHLORIDE, SODIUM LACTATE AND CALCIUM CHLORIDE 50 ML/HR: 600; 310; 30; 20 INJECTION, SOLUTION INTRAVENOUS at 22:04

## 2017-11-12 RX ADMIN — ONDANSETRON 4 MG: 4 TABLET, FILM COATED ORAL at 04:49

## 2017-11-12 RX ADMIN — CLINDAMYCIN PHOSPHATE 600 MG: 12 INJECTION, SOLUTION INTRAVENOUS at 04:49

## 2017-11-12 RX ADMIN — AMPICILLIN SODIUM 2 G: 2 INJECTION, POWDER, FOR SOLUTION INTRAMUSCULAR; INTRAVENOUS at 08:36

## 2017-11-12 RX ADMIN — AMPICILLIN SODIUM 2 G: 2 INJECTION, POWDER, FOR SOLUTION INTRAMUSCULAR; INTRAVENOUS at 15:35

## 2017-11-12 RX ADMIN — AMPICILLIN SODIUM 2 G: 2 INJECTION, POWDER, FOR SOLUTION INTRAMUSCULAR; INTRAVENOUS at 03:38

## 2017-11-12 RX ADMIN — CLINDAMYCIN PHOSPHATE 600 MG: 12 INJECTION, SOLUTION INTRAVENOUS at 17:00

## 2017-11-12 RX ADMIN — FERROUS SULFATE TAB 325 MG (65 MG ELEMENTAL FE) 325 MG: 325 (65 FE) TAB at 17:53

## 2017-11-12 RX ADMIN — AMPICILLIN SODIUM 2 G: 2 INJECTION, POWDER, FOR SOLUTION INTRAMUSCULAR; INTRAVENOUS at 20:52

## 2017-11-12 RX ADMIN — GENTAMICIN SULFATE 230 MG: 40 INJECTION, SOLUTION INTRAMUSCULAR; INTRAVENOUS at 14:51

## 2017-11-12 RX ADMIN — ONDANSETRON 4 MG: 2 INJECTION, SOLUTION INTRAMUSCULAR; INTRAVENOUS at 11:05

## 2017-11-12 RX ADMIN — HYDROXYCHLOROQUINE SULFATE 200 MG: 200 TABLET, FILM COATED ORAL at 08:36

## 2017-11-12 NOTE — PLAN OF CARE
Problem: Patient Care Overview (Adult)  Goal: Plan of Care Review  Outcome: Ongoing (interventions implemented as appropriate)    11/12/17 1427   Patient Care Overview   Progress no change

## 2017-11-12 NOTE — PROGRESS NOTES
Pharmacokinetics Service Note:    Ms. Bacon has been evaluated for gentamicin dosing to treat her possible ureterovaginal fistula/UTI.  She received an 80 mg dose last PM around midnight.  Based on her demographics and estimated normal renal function, will begin extended interval dosing at 5 mg/kg (230 mg) q24hrs x 3 days.  No levels will be obtained unless treatment is continued beyond the initially ordered 3 day course.      Thank you.  Jazzy Cat, Pharm.D.  11/12/2017  1:29 PM

## 2017-11-12 NOTE — PLAN OF CARE
Problem: Patient Care Overview (Adult)  Goal: Plan of Care Review  Outcome: Ongoing (interventions implemented as appropriate)    11/12/17 0254   Coping/Psychosocial Response Interventions   Plan Of Care Reviewed With patient   Patient Care Overview   Progress no change       Goal: Adult Individualization and Mutuality  Outcome: Ongoing (interventions implemented as appropriate)  Goal: Discharge Needs Assessment  Outcome: Ongoing (interventions implemented as appropriate)    Problem: Infection, Risk/Actual (Adult)  Goal: Infection Prevention/Resolution  Outcome: Ongoing (interventions implemented as appropriate)

## 2017-11-12 NOTE — PROGRESS NOTES
White count this morning is normal.  I brought the patient to the office after we give her Pyridium and waited for her urine to become orange.  We had a tampon in and when I removed it, I did not see any orange discoloration on the tampon.  I put in a speculum and her suture line looks good.  I did not see any urine draining in the vagina.  She is tender across her lower abdomen, but I'm hoping of what happened, was that intercourse just stirred up tenderness in her pelvis and she is okay.  I'm going to order a CT scan with and without contrast for tomorrow.  I removed her catheter.  We will repeat her CBC tomorrow.  If everything is looking good and all of her testing remains negative, then she can probably stay until Tuesday, see Dr. Shrestha to make sure that he is aware of everything, and hopefully she will not require any further treatment.

## 2017-11-12 NOTE — H&P
HISTORY    Chief complaint  Pelvic pain    History of present illness  This is a 42 year old patient who underwent NANO/BSO by Dr. Armand Shrestha August 23, 2017.  I received a call from the emergency room doctor in [facility] yesterday and he said that the patient had a vagina full of urine.  He did a CT scan at my request and gave the patient Methelene blue into her catheter.  He said that he did not see any blue discoloration in the vagina.  He told me her white blood cell count was 25,000.  I told him to transfer the patient up here.    The patient herself gives a history of having been just fine until she tried to have intercourse yesterday morning.  Previous to that, her bowel movements were normal, she had been urinating normal, she had no fever and everything was okay.  Then, after intercourse she had acute lower pelvic pain, and went to the emergency room.    Past medical, surgical, obstetrical history  NAD;    Family history  NAD    Social history  NAD    Functional inquiry  NAD                             PHYSICAL EXAMINATION    General  In no acute distress    HEENT  Throat and ears are clear, no masses or nodes were palpable    CVR  Heart sounds are normal with no murmurs, chest is clear to IPPA    GI/  Abdomen is soft with no masses tenderness or organomegaly.  Pelvic examination reveals a patient in no acute distress, sitting up in bed talking to me.    MS  No gross bone or joint abnormalities.                                        IMPRESSION  Possible fistula postop, needing investigation.                                    TREATMENT PLAN    I gave the patient 200 mg of Pyridium.  We are going to take her to the office this morning.  She will put a tampon in and when I removed the tampon we will see if she has any discoloration, or and, on the tampon to indicate possible ureterovaginal fistula.

## 2017-11-13 ENCOUNTER — APPOINTMENT (OUTPATIENT)
Dept: CT IMAGING | Facility: HOSPITAL | Age: 42
End: 2017-11-13

## 2017-11-13 VITALS
HEIGHT: 61 IN | DIASTOLIC BLOOD PRESSURE: 72 MMHG | RESPIRATION RATE: 18 BRPM | HEART RATE: 83 BPM | SYSTOLIC BLOOD PRESSURE: 129 MMHG | TEMPERATURE: 99 F | OXYGEN SATURATION: 99 %

## 2017-11-13 LAB
BASOPHILS # BLD AUTO: 0.01 10*3/MM3 (ref 0–0.3)
BASOPHILS NFR BLD AUTO: 0.1 % (ref 0–2)
DEPRECATED RDW RBC AUTO: 40.9 FL (ref 37–54)
EOSINOPHIL # BLD AUTO: 0.08 10*3/MM3 (ref 0–0.7)
EOSINOPHIL NFR BLD AUTO: 0.8 % (ref 0–5)
ERYTHROCYTE [DISTWIDTH] IN BLOOD BY AUTOMATED COUNT: 13.6 % (ref 11.5–14.5)
HCT VFR BLD AUTO: 40.6 % (ref 37–47)
HGB BLD-MCNC: 13.1 G/DL (ref 12–16)
IMM GRANULOCYTES # BLD: 0.02 10*3/MM3 (ref 0–0.03)
IMM GRANULOCYTES NFR BLD: 0.2 % (ref 0–0.5)
LYMPHOCYTES # BLD AUTO: 0.76 10*3/MM3 (ref 1–3)
LYMPHOCYTES NFR BLD AUTO: 7.4 % (ref 21–51)
MCH RBC QN AUTO: 26.4 PG (ref 27–33)
MCHC RBC AUTO-ENTMCNC: 32.3 G/DL (ref 33–37)
MCV RBC AUTO: 81.9 FL (ref 80–94)
MONOCYTES # BLD AUTO: 0.75 10*3/MM3 (ref 0.1–0.9)
MONOCYTES NFR BLD AUTO: 7.3 % (ref 0–10)
NEUTROPHILS # BLD AUTO: 8.66 10*3/MM3 (ref 1.4–6.5)
NEUTROPHILS NFR BLD AUTO: 84.2 % (ref 30–70)
PLATELET # BLD AUTO: 170 10*3/MM3 (ref 130–400)
PMV BLD AUTO: 10.1 FL (ref 6–10)
RBC # BLD AUTO: 4.96 10*6/MM3 (ref 4.2–5.4)
WBC NRBC COR # BLD: 10.28 10*3/MM3 (ref 4.5–12.5)

## 2017-11-13 PROCEDURE — 25010000002 GENTAMICIN PER 80 MG

## 2017-11-13 PROCEDURE — 96366 THER/PROPH/DIAG IV INF ADDON: CPT

## 2017-11-13 PROCEDURE — 0 IOPAMIDOL 61 % SOLUTION: Performed by: OBSTETRICS & GYNECOLOGY

## 2017-11-13 PROCEDURE — 96375 TX/PRO/DX INJ NEW DRUG ADDON: CPT

## 2017-11-13 PROCEDURE — 74178 CT ABD&PLV WO CNTR FLWD CNTR: CPT | Performed by: RADIOLOGY

## 2017-11-13 PROCEDURE — 25010000002 ONDANSETRON PER 1 MG: Performed by: OBSTETRICS & GYNECOLOGY

## 2017-11-13 PROCEDURE — G0378 HOSPITAL OBSERVATION PER HR: HCPCS

## 2017-11-13 PROCEDURE — 74178 CT ABD&PLV WO CNTR FLWD CNTR: CPT

## 2017-11-13 PROCEDURE — 96367 TX/PROPH/DG ADDL SEQ IV INF: CPT

## 2017-11-13 PROCEDURE — 85025 COMPLETE CBC W/AUTO DIFF WBC: CPT | Performed by: OBSTETRICS & GYNECOLOGY

## 2017-11-13 RX ORDER — CLINDAMYCIN HYDROCHLORIDE 150 MG/1
300 CAPSULE ORAL EVERY 8 HOURS SCHEDULED
Status: DISCONTINUED | OUTPATIENT
Start: 2017-11-13 | End: 2017-11-13 | Stop reason: HOSPADM

## 2017-11-13 RX ORDER — CLINDAMYCIN HYDROCHLORIDE 300 MG/1
300 CAPSULE ORAL EVERY 8 HOURS SCHEDULED
Qty: 21 CAPSULE | Refills: 0 | Status: SHIPPED | OUTPATIENT
Start: 2017-11-13 | End: 2017-11-20

## 2017-11-13 RX ADMIN — AMPICILLIN SODIUM 2 G: 2 INJECTION, POWDER, FOR SOLUTION INTRAMUSCULAR; INTRAVENOUS at 03:04

## 2017-11-13 RX ADMIN — AMPICILLIN SODIUM 2 G: 2 INJECTION, POWDER, FOR SOLUTION INTRAMUSCULAR; INTRAVENOUS at 10:26

## 2017-11-13 RX ADMIN — CLINDAMYCIN PHOSPHATE 600 MG: 12 INJECTION, SOLUTION INTRAVENOUS at 12:47

## 2017-11-13 RX ADMIN — CLINDAMYCIN PHOSPHATE 600 MG: 12 INJECTION, SOLUTION INTRAVENOUS at 03:58

## 2017-11-13 RX ADMIN — GENTAMICIN SULFATE 230 MG: 40 INJECTION, SOLUTION INTRAMUSCULAR; INTRAVENOUS at 14:13

## 2017-11-13 RX ADMIN — ONDANSETRON 4 MG: 2 INJECTION, SOLUTION INTRAMUSCULAR; INTRAVENOUS at 09:28

## 2017-11-13 RX ADMIN — IOPAMIDOL 100 ML: 612 INJECTION, SOLUTION INTRAVENOUS at 12:00

## 2017-11-13 NOTE — PROGRESS NOTES
Subjective     Subjective  Patient reports:  Pain is well controlled this am diffuse tenderness still lower pelvic  She is ambulating. Tolerating po -- normal.  Intake -- c/o of tolerating po solids.   flatus.  Voiding -- without difficulty.    Objective     Objective  Physical Exam:  General WNWD NAD this am drinking contrast for CT   Lungs: normal   Abdomen: abdomen is soft + diffuse tenderness lower pelvic regsion, no masses, organomegaly or guarding.   Extremities:  extremities normal, atraumatic, no cyanosis or edema     Vital Sign Min/Max    Temp  Min: 98 °F (36.7 °C)  Max: 100 °F (37.8 °C)   Pulse  Min: 84  Max: 88   Resp  Min: 18  Max: 18   BP  Min: 119/71  Max: 133/74      Intake/Output last 24 hours:    Intake/Output Summary (Last 24 hours) at 11/13/17 0918  Last data filed at 11/13/17 0358   Gross per 24 hour   Intake              300 ml   Output              400 ml   Net             -100 ml       Labs/Studies reviewed:  Yes  CT schedule for this am  Radiology studies reviewed:   Yes   Medications reviewed:   Yes     Assessment/Plan       CT scan of pelvis.    Active Problems:    Pelvic pain      Plan: follow up with CT this am, Urine Cx neg for growth x 24 hrs, exam per dr cartwright revealed suture line from Hysterectomy in august intact, test for fistula negative.  Pt with good UO and + gas this am.  Will consider d/c home tonight if pt doing well and CT wnl.              SANJU Card  11/13/17  9:18 AM

## 2017-11-13 NOTE — PLAN OF CARE
Problem: Patient Care Overview (Adult)  Goal: Plan of Care Review  Outcome: Ongoing (interventions implemented as appropriate)    11/13/17 0730 11/13/17 1622   Coping/Psychosocial Response Interventions   Plan Of Care Reviewed With patient --    Patient Care Overview   Progress --  improving       Goal: Adult Individualization and Mutuality  Outcome: Ongoing (interventions implemented as appropriate)  Goal: Discharge Needs Assessment  Outcome: Ongoing (interventions implemented as appropriate)    11/13/17 1008 11/13/17 1622   Discharge Needs Assessment   Concerns To Be Addressed no discharge needs identified --    Readmission Within The Last 30 Days --  no previous admission in last 30 days   Equipment Needed After Discharge --  none   Current Health   Anticipated Changes Related to Illness --  none   Self-Care   Equipment Currently Used at Home --  none   Living Environment   Transportation Available car --          Problem: Infection, Risk/Actual (Adult)  Goal: Identify Related Risk Factors and Signs and Symptoms  Outcome: Ongoing (interventions implemented as appropriate)    11/13/17 1622   Infection, Risk/Actual   Infection, Risk/Actual: Related Risk Factors other (see comments)   Signs and Symptoms (Infection, Risk/Actual) pain

## 2017-11-13 NOTE — NURSING NOTE
Pt doing well, tolerating diet, pain rating a 2, CT negative. Follow up with Dr. Shrestha 11/16/17 at 1:00, Clindamycin 300 mg PO TID for 7 days.

## 2017-11-14 LAB — BACTERIA SPEC AEROBE CULT: NO GROWTH

## 2017-11-16 LAB
BACTERIA SPEC AEROBE CULT: NORMAL
BACTERIA SPEC AEROBE CULT: NORMAL

## 2017-12-19 ENCOUNTER — TRANSCRIBE ORDERS (OUTPATIENT)
Dept: ADMINISTRATIVE | Facility: HOSPITAL | Age: 42
End: 2017-12-19

## 2017-12-19 ENCOUNTER — LAB (OUTPATIENT)
Dept: LAB | Facility: HOSPITAL | Age: 42
End: 2017-12-19
Attending: INTERNAL MEDICINE

## 2017-12-19 DIAGNOSIS — M32.9 SYSTEMIC LUPUS ERYTHEMATOSUS, UNSPECIFIED SLE TYPE, UNSPECIFIED ORGAN INVOLVEMENT STATUS (HCC): ICD-10-CM

## 2017-12-19 DIAGNOSIS — M32.9 SYSTEMIC LUPUS ERYTHEMATOSUS, UNSPECIFIED SLE TYPE, UNSPECIFIED ORGAN INVOLVEMENT STATUS (HCC): Primary | ICD-10-CM

## 2017-12-19 LAB
ALBUMIN SERPL-MCNC: 4.9 G/DL (ref 3.5–5)
ALBUMIN/GLOB SERPL: 1.5 G/DL (ref 1.5–2.5)
ALP SERPL-CCNC: 55 U/L (ref 35–104)
ALT SERPL W P-5'-P-CCNC: 23 U/L (ref 10–36)
ANION GAP SERPL CALCULATED.3IONS-SCNC: 7.8 MMOL/L (ref 3.6–11.2)
AST SERPL-CCNC: 23 U/L (ref 10–30)
BACTERIA UR QL AUTO: NORMAL /HPF
BILIRUB SERPL-MCNC: 0.6 MG/DL (ref 0.2–1.8)
BILIRUB UR QL STRIP: NEGATIVE
BUN BLD-MCNC: 14 MG/DL (ref 7–21)
BUN/CREAT SERPL: 24.1 (ref 7–25)
C3 SERPL-MCNC: 132.2 MG/DL (ref 90–170)
C4 SERPL-MCNC: 32.3 MG/DL (ref 12–36)
CALCIUM SPEC-SCNC: 10 MG/DL (ref 7.7–10)
CHLORIDE SERPL-SCNC: 110 MMOL/L (ref 99–112)
CLARITY UR: CLEAR
CO2 SERPL-SCNC: 24.2 MMOL/L (ref 24.3–31.9)
COLOR UR: YELLOW
CREAT BLD-MCNC: 0.58 MG/DL (ref 0.43–1.29)
GFR SERPL CREATININE-BSD FRML MDRD: 115 ML/MIN/1.73
GLOBULIN UR ELPH-MCNC: 3.2 GM/DL
GLUCOSE BLD-MCNC: 110 MG/DL (ref 70–110)
GLUCOSE UR STRIP-MCNC: NEGATIVE MG/DL
HGB UR QL STRIP.AUTO: NEGATIVE
HYALINE CASTS UR QL AUTO: NORMAL /LPF
KETONES UR QL STRIP: NEGATIVE
LEUKOCYTE ESTERASE UR QL STRIP.AUTO: NEGATIVE
NITRITE UR QL STRIP: NEGATIVE
OSMOLALITY SERPL CALC.SUM OF ELEC: 284.2 MOSM/KG (ref 273–305)
PH UR STRIP.AUTO: <=5 [PH] (ref 5–8)
POTASSIUM BLD-SCNC: 3.5 MMOL/L (ref 3.5–5.3)
PROT SERPL-MCNC: 8.1 G/DL (ref 6–8)
PROT UR QL STRIP: NEGATIVE
RBC # UR: NORMAL /HPF
REF LAB TEST METHOD: NORMAL
SODIUM BLD-SCNC: 142 MMOL/L (ref 135–153)
SP GR UR STRIP: 1.02 (ref 1–1.03)
SQUAMOUS #/AREA URNS HPF: NORMAL /HPF
UROBILINOGEN UR QL STRIP: NORMAL
WBC UR QL AUTO: NORMAL /HPF

## 2017-12-19 PROCEDURE — 36415 COLL VENOUS BLD VENIPUNCTURE: CPT

## 2017-12-19 PROCEDURE — 86235 NUCLEAR ANTIGEN ANTIBODY: CPT

## 2017-12-19 PROCEDURE — 80053 COMPREHEN METABOLIC PANEL: CPT

## 2017-12-19 PROCEDURE — 86225 DNA ANTIBODY NATIVE: CPT

## 2017-12-19 PROCEDURE — 81001 URINALYSIS AUTO W/SCOPE: CPT

## 2017-12-19 PROCEDURE — 86160 COMPLEMENT ANTIGEN: CPT

## 2017-12-20 LAB
CENTROMERE B AB SER-ACNC: <0.2 AI (ref 0–0.9)
CHROMATIN AB SERPL-ACNC: 2.8 AI (ref 0–0.9)
DSDNA AB SER-ACNC: <1 IU/ML (ref 0–9)
ENA JO1 AB SER-ACNC: <0.2 AI (ref 0–0.9)
ENA RNP AB SER-ACNC: >8 AI (ref 0–0.9)
ENA SCL70 AB SER-ACNC: <0.2 AI (ref 0–0.9)
ENA SM AB SER-ACNC: 0.2 AI (ref 0–0.9)
ENA SS-A AB SER-ACNC: 1.2 AI (ref 0–0.9)
ENA SS-B AB SER-ACNC: <0.2 AI (ref 0–0.9)
Lab: ABNORMAL

## 2018-06-05 ENCOUNTER — TRANSCRIBE ORDERS (OUTPATIENT)
Dept: ADMINISTRATIVE | Facility: HOSPITAL | Age: 43
End: 2018-06-05

## 2018-10-11 ENCOUNTER — APPOINTMENT (OUTPATIENT)
Dept: MAMMOGRAPHY | Facility: HOSPITAL | Age: 43
End: 2018-10-11

## 2018-10-24 ENCOUNTER — HOSPITAL ENCOUNTER (OUTPATIENT)
Dept: MAMMOGRAPHY | Facility: HOSPITAL | Age: 43
Discharge: HOME | End: 2018-10-24
Admitting: NURSE PRACTITIONER

## 2018-10-24 DIAGNOSIS — Z12.39 SCREENING BREAST EXAMINATION: ICD-10-CM

## 2018-10-24 PROCEDURE — 77067 SCR MAMMO BI INCL CAD: CPT

## 2018-10-24 PROCEDURE — 77063 BREAST TOMOSYNTHESIS BI: CPT | Performed by: RADIOLOGY

## 2018-10-24 PROCEDURE — 77067 SCR MAMMO BI INCL CAD: CPT | Performed by: RADIOLOGY

## 2018-10-24 PROCEDURE — 77063 BREAST TOMOSYNTHESIS BI: CPT

## 2018-10-30 ENCOUNTER — HOSPITAL ENCOUNTER (OUTPATIENT)
Dept: ULTRASOUND IMAGING | Facility: HOSPITAL | Age: 43
Discharge: HOME | End: 2018-10-30
Admitting: RADIOLOGY

## 2018-10-30 DIAGNOSIS — R92.8 ABNORMAL MAMMOGRAM: ICD-10-CM

## 2018-10-30 PROCEDURE — 76642 ULTRASOUND BREAST LIMITED: CPT | Performed by: RADIOLOGY

## 2018-10-30 PROCEDURE — 76642 ULTRASOUND BREAST LIMITED: CPT

## 2019-04-16 ENCOUNTER — LAB (OUTPATIENT)
Dept: LAB | Facility: HOSPITAL | Age: 44
End: 2019-04-16

## 2019-04-16 ENCOUNTER — TRANSCRIBE ORDERS (OUTPATIENT)
Dept: ADMINISTRATIVE | Facility: HOSPITAL | Age: 44
End: 2019-04-16

## 2019-04-16 DIAGNOSIS — M32.9 SYSTEMIC LUPUS ERYTHEMATOSUS, UNSPECIFIED SLE TYPE, UNSPECIFIED ORGAN INVOLVEMENT STATUS (HCC): ICD-10-CM

## 2019-04-16 DIAGNOSIS — Z79.899 ENCOUNTER FOR LONG-TERM (CURRENT) USE OF MEDICATIONS: ICD-10-CM

## 2019-04-16 DIAGNOSIS — M32.9 SYSTEMIC LUPUS ERYTHEMATOSUS, UNSPECIFIED SLE TYPE, UNSPECIFIED ORGAN INVOLVEMENT STATUS (HCC): Primary | ICD-10-CM

## 2019-04-16 LAB
ALBUMIN SERPL-MCNC: 4.7 G/DL (ref 3.5–5.2)
ALBUMIN/GLOB SERPL: 2 G/DL
ALP SERPL-CCNC: 47 U/L (ref 39–117)
ALT SERPL W P-5'-P-CCNC: 17 U/L (ref 1–33)
ANION GAP SERPL CALCULATED.3IONS-SCNC: 14.1 MMOL/L
AST SERPL-CCNC: 22 U/L (ref 1–32)
BASOPHILS # BLD AUTO: 0.06 10*3/MM3 (ref 0–0.2)
BASOPHILS NFR BLD AUTO: 0.8 % (ref 0–1.5)
BILIRUB SERPL-MCNC: 0.5 MG/DL (ref 0.2–1.2)
BUN BLD-MCNC: 7 MG/DL (ref 6–20)
BUN/CREAT SERPL: 12.3 (ref 7–25)
CALCIUM SPEC-SCNC: 9.5 MG/DL (ref 8.6–10.5)
CHLORIDE SERPL-SCNC: 102 MMOL/L (ref 98–107)
CO2 SERPL-SCNC: 25.9 MMOL/L (ref 22–29)
CREAT BLD-MCNC: 0.57 MG/DL (ref 0.57–1)
DEPRECATED RDW RBC AUTO: 46.2 FL (ref 37–54)
EOSINOPHIL # BLD AUTO: 0.11 10*3/MM3 (ref 0–0.4)
EOSINOPHIL NFR BLD AUTO: 1.5 % (ref 0.3–6.2)
ERYTHROCYTE [DISTWIDTH] IN BLOOD BY AUTOMATED COUNT: 14 % (ref 12.3–15.4)
GFR SERPL CREATININE-BSD FRML MDRD: 116 ML/MIN/1.73
GLOBULIN UR ELPH-MCNC: 2.4 GM/DL
GLUCOSE BLD-MCNC: 92 MG/DL (ref 65–99)
HCT VFR BLD AUTO: 47.4 % (ref 34–46.6)
HGB BLD-MCNC: 14.6 G/DL (ref 12–15.9)
IMM GRANULOCYTES # BLD AUTO: 0.03 10*3/MM3 (ref 0–0.05)
IMM GRANULOCYTES NFR BLD AUTO: 0.4 % (ref 0–0.5)
LYMPHOCYTES # BLD AUTO: 1.27 10*3/MM3 (ref 0.7–3.1)
LYMPHOCYTES NFR BLD AUTO: 17.4 % (ref 19.6–45.3)
MCH RBC QN AUTO: 27.8 PG (ref 26.6–33)
MCHC RBC AUTO-ENTMCNC: 30.8 G/DL (ref 31.5–35.7)
MCV RBC AUTO: 90.1 FL (ref 79–97)
MONOCYTES # BLD AUTO: 0.51 10*3/MM3 (ref 0.1–0.9)
MONOCYTES NFR BLD AUTO: 7 % (ref 5–12)
NEUTROPHILS # BLD AUTO: 5.31 10*3/MM3 (ref 1.4–7)
NEUTROPHILS NFR BLD AUTO: 72.9 % (ref 42.7–76)
NRBC BLD AUTO-RTO: 0 /100 WBC (ref 0–0)
PLATELET # BLD AUTO: 220 10*3/MM3 (ref 140–450)
PMV BLD AUTO: 10.1 FL (ref 6–12)
POTASSIUM BLD-SCNC: 4.1 MMOL/L (ref 3.5–5.2)
PROT SERPL-MCNC: 7.1 G/DL (ref 6–8.5)
RBC # BLD AUTO: 5.26 10*6/MM3 (ref 3.77–5.28)
SODIUM BLD-SCNC: 142 MMOL/L (ref 136–145)
WBC NRBC COR # BLD: 7.29 10*3/MM3 (ref 3.4–10.8)

## 2019-04-16 PROCEDURE — 80053 COMPREHEN METABOLIC PANEL: CPT

## 2019-04-16 PROCEDURE — 85025 COMPLETE CBC W/AUTO DIFF WBC: CPT

## 2019-04-16 PROCEDURE — 36415 COLL VENOUS BLD VENIPUNCTURE: CPT

## 2020-02-25 ENCOUNTER — LAB (OUTPATIENT)
Dept: LAB | Facility: HOSPITAL | Age: 45
End: 2020-02-25

## 2020-02-25 ENCOUNTER — TRANSCRIBE ORDERS (OUTPATIENT)
Dept: ADMINISTRATIVE | Facility: HOSPITAL | Age: 45
End: 2020-02-25

## 2020-02-25 DIAGNOSIS — M32.9 SYSTEMIC LUPUS ERYTHEMATOSUS, UNSPECIFIED SLE TYPE, UNSPECIFIED ORGAN INVOLVEMENT STATUS (HCC): Primary | ICD-10-CM

## 2020-02-25 DIAGNOSIS — Z79.899 ENCOUNTER FOR LONG-TERM (CURRENT) USE OF OTHER MEDICATIONS: ICD-10-CM

## 2020-02-25 DIAGNOSIS — M32.9 SYSTEMIC LUPUS ERYTHEMATOSUS, UNSPECIFIED SLE TYPE, UNSPECIFIED ORGAN INVOLVEMENT STATUS (HCC): ICD-10-CM

## 2020-02-25 DIAGNOSIS — M06.09 RHEUMATOID ARTHRITIS OF MULTIPLE SITES WITHOUT RHEUMATOID FACTOR (HCC): ICD-10-CM

## 2020-02-25 PROCEDURE — 36415 COLL VENOUS BLD VENIPUNCTURE: CPT

## 2020-02-25 PROCEDURE — 85652 RBC SED RATE AUTOMATED: CPT

## 2020-02-25 PROCEDURE — 80053 COMPREHEN METABOLIC PANEL: CPT

## 2020-02-25 PROCEDURE — 85025 COMPLETE CBC W/AUTO DIFF WBC: CPT

## 2020-02-25 PROCEDURE — 86140 C-REACTIVE PROTEIN: CPT

## 2020-02-26 LAB
ALBUMIN SERPL-MCNC: 4.4 G/DL (ref 3.5–5.2)
ALBUMIN/GLOB SERPL: 1.8 G/DL
ALP SERPL-CCNC: 49 U/L (ref 39–117)
ALT SERPL W P-5'-P-CCNC: 19 U/L (ref 1–33)
ANION GAP SERPL CALCULATED.3IONS-SCNC: 13.2 MMOL/L (ref 5–15)
AST SERPL-CCNC: 22 U/L (ref 1–32)
BASOPHILS # BLD AUTO: 0.09 10*3/MM3 (ref 0–0.2)
BASOPHILS NFR BLD AUTO: 1 % (ref 0–1.5)
BILIRUB SERPL-MCNC: 0.2 MG/DL (ref 0.2–1.2)
BUN BLD-MCNC: 18 MG/DL (ref 6–20)
BUN/CREAT SERPL: 34 (ref 7–25)
CALCIUM SPEC-SCNC: 9.3 MG/DL (ref 8.6–10.5)
CHLORIDE SERPL-SCNC: 102 MMOL/L (ref 98–107)
CO2 SERPL-SCNC: 23.8 MMOL/L (ref 22–29)
CREAT BLD-MCNC: 0.53 MG/DL (ref 0.57–1)
CRP SERPL-MCNC: 0.08 MG/DL (ref 0–0.5)
DEPRECATED RDW RBC AUTO: 39.2 FL (ref 37–54)
EOSINOPHIL # BLD AUTO: 0.26 10*3/MM3 (ref 0–0.4)
EOSINOPHIL NFR BLD AUTO: 2.9 % (ref 0.3–6.2)
ERYTHROCYTE [DISTWIDTH] IN BLOOD BY AUTOMATED COUNT: 12.6 % (ref 12.3–15.4)
ERYTHROCYTE [SEDIMENTATION RATE] IN BLOOD: 3 MM/HR (ref 0–20)
GFR SERPL CREATININE-BSD FRML MDRD: 126 ML/MIN/1.73
GLOBULIN UR ELPH-MCNC: 2.4 GM/DL
GLUCOSE BLD-MCNC: 71 MG/DL (ref 65–99)
HCT VFR BLD AUTO: 44.8 % (ref 34–46.6)
HGB BLD-MCNC: 14.3 G/DL (ref 12–15.9)
IMM GRANULOCYTES # BLD AUTO: 0.02 10*3/MM3 (ref 0–0.05)
IMM GRANULOCYTES NFR BLD AUTO: 0.2 % (ref 0–0.5)
LYMPHOCYTES # BLD AUTO: 1.48 10*3/MM3 (ref 0.7–3.1)
LYMPHOCYTES NFR BLD AUTO: 16.7 % (ref 19.6–45.3)
MCH RBC QN AUTO: 27.1 PG (ref 26.6–33)
MCHC RBC AUTO-ENTMCNC: 31.9 G/DL (ref 31.5–35.7)
MCV RBC AUTO: 85 FL (ref 79–97)
MONOCYTES # BLD AUTO: 0.76 10*3/MM3 (ref 0.1–0.9)
MONOCYTES NFR BLD AUTO: 8.6 % (ref 5–12)
NEUTROPHILS # BLD AUTO: 6.24 10*3/MM3 (ref 1.7–7)
NEUTROPHILS NFR BLD AUTO: 70.6 % (ref 42.7–76)
NRBC BLD AUTO-RTO: 0.1 /100 WBC (ref 0–0.2)
PLATELET # BLD AUTO: 236 10*3/MM3 (ref 140–450)
PMV BLD AUTO: 9.9 FL (ref 6–12)
POTASSIUM BLD-SCNC: 3.6 MMOL/L (ref 3.5–5.2)
PROT SERPL-MCNC: 6.8 G/DL (ref 6–8.5)
RBC # BLD AUTO: 5.27 10*6/MM3 (ref 3.77–5.28)
SODIUM BLD-SCNC: 139 MMOL/L (ref 136–145)
WBC NRBC COR # BLD: 8.85 10*3/MM3 (ref 3.4–10.8)

## (undated) DEVICE — PK BASIC 70

## (undated) DEVICE — VIOLET BRAIDED (POLYGLACTIN 910), SYNTHETIC ABSORBABLE SUTURE: Brand: COATED VICRYL

## (undated) DEVICE — TRY SKINPREP PVP SCRB W PAINT

## (undated) DEVICE — GOWN,REINF,POLY,ECL,PP SLV,XXL: Brand: MEDLINE

## (undated) DEVICE — 3M™ STERI-STRIP™ REINFORCED ADHESIVE SKIN CLOSURES, R1547, 1/2 IN X 4 IN (12 MM X 100 MM), 6 STRIPS/ENVELOPE: Brand: 3M™ STERI-STRIP™

## (undated) DEVICE — PAD GRND REM POLYHESIVE A/ DISP

## (undated) DEVICE — CAUTERY TIP POLISHER: Brand: DEVON

## (undated) DEVICE — APPL CHLORAPREP W/TINT 26ML ORNG

## (undated) DEVICE — TOTAL TRAY, 16FR 10ML SIL FOLEY, URN: Brand: MEDLINE

## (undated) DEVICE — SUT VIC 3/0 SH 27IN J416H

## (undated) DEVICE — ANTIBACTERIAL UNDYED BRAIDED (POLYGLACTIN 910), SYNTHETIC ABSORBABLE SUTURE: Brand: COATED VICRYL

## (undated) DEVICE — SUT MNCRYL 3/0 CT1 36 IN Y944H

## (undated) DEVICE — SKIN AFFIX SURG ADHESIVE 72/CS 0.55ML: Brand: MEDLINE

## (undated) DEVICE — DRAPE,T,LAPARO,TRANS,STERILE: Brand: MEDLINE

## (undated) DEVICE — BASIN SET PACK: Brand: MEDLINE INDUSTRIES, INC.

## (undated) DEVICE — ENSEAL 20 CM SHAFT, LARGE JAW: Brand: ENSEAL X1

## (undated) DEVICE — ADHS LIQ MASTISOL 2/3ML

## (undated) DEVICE — 4 ELECTRODE SENSOR FOR USE WITH BIS MONITORING SYSTEMS: Brand: BIS

## (undated) DEVICE — TOWEL,OR,DSP,ST,WHITE,DLX,XR,2/PK,40PK/C: Brand: MEDLINE

## (undated) DEVICE — AMD ANTIMICROBIAL NON-ADHERENT ISLAND DRESSING,0.2% POLYHEXAMETHYLENE BIGUANIDE HCI (PHMB): Brand: TELFA

## (undated) DEVICE — POOLE SUCTION INSTRUMENT WITH REMOVABLE SHEATH: Brand: POOLE

## (undated) DEVICE — SPNG LAP PREWASH SFTPK 18X18IN 5PK STRL

## (undated) DEVICE — SUT VIC 4/0 KS 27IN VCP662H